# Patient Record
Sex: FEMALE | Race: WHITE | URBAN - METROPOLITAN AREA
[De-identification: names, ages, dates, MRNs, and addresses within clinical notes are randomized per-mention and may not be internally consistent; named-entity substitution may affect disease eponyms.]

---

## 2019-12-04 ENCOUNTER — HISTORICAL (OUTPATIENT)
Dept: ADMINISTRATIVE | Facility: HOSPITAL | Age: 54
End: 2019-12-04

## 2020-03-04 LAB — ERYTHROCYTE [SEDIMENTATION RATE] IN BLOOD: 1 MM/HR (ref 0–20)

## 2020-03-09 ENCOUNTER — HISTORICAL (OUTPATIENT)
Dept: LAB | Facility: HOSPITAL | Age: 55
End: 2020-03-09

## 2022-04-10 ENCOUNTER — HISTORICAL (OUTPATIENT)
Dept: ADMINISTRATIVE | Facility: HOSPITAL | Age: 57
End: 2022-04-10

## 2022-04-27 VITALS
WEIGHT: 193.56 LBS | DIASTOLIC BLOOD PRESSURE: 75 MMHG | SYSTOLIC BLOOD PRESSURE: 124 MMHG | BODY MASS INDEX: 31.11 KG/M2 | HEIGHT: 66 IN

## 2022-05-04 NOTE — HISTORICAL [facility] CERNER
This is a historical note converted from Jordin. Formatting and pictures may have been removed.  Please reference Jordin for original formatting and attached multimedia. Chief Complaint  Rt knee sprain  History of Present Illness  55 yo?RHD Female?1-2ppwk smoker?p[facility]?for?initial visit?for?right?knee pain x 7 weeks. Patient points to?anterior and posterior R knee diffusely.  ?   Onset:?7 weeks ago  Previous treatment:?ED 11/24/19 - Baclofen 10mg TID prn, Norco 5-325 q6hr x 5d and did not help; extra strength tylenol not help; diclofenac gel not help much; HEP; US in 10/2019 neg; brace not help but wrapping it does  Previous injuries:?while standing and turned, twisted knee with pop on 11/23/19 and would lock  Current pain level: 9/10 (rated as?severe)?without pain medication. Quality described as throbbing/indescribable  Modifying Factors:?worse with activity; improved with rest;?stiffness after immobilization;?stiffness not improved?with activity  Associated Symptoms:?crepitus, no grinding;?intermittent numbness, no tingling; intermittent swelling;?no skin changes; some?weakness;?mild decrease in ROM  Activity:?full ADLs; pain limits daily activity (severe)  ?  ?   PMH: left foot compression fx s/p boot (4wks; done 1 mos ago); RA; bone spur in R heel; HTN  PSH: hysterectomy, cholecystectomy, appendectomy  Meds: as in chart  Allergies: codeine  SH: smokes 2ppwk, drinks 1/wk, no drugs  Occupation: pharmacist   FH:?family history of arthritis - mother, gma  Review of Systems  Concussion ROS  Constitutional: no fever, no chills, no weight loss  Eyes: negative except as stated in the?HPI  ENT: negative except as stated in the?HPI  Resp: no cough, no SOB  CV: no swelling, no edema  GI: no urinary retention, no urinary incontinence  : no fecal incontinence  Skin: no rash, no wound  Neuro: negative except as stated in the?HPI  MSK: negative except as stated in the?HPI  Psych: negative  except as stated in the?HPI  Heme/Lymph: no easy bruising, no easy bleeding, no lymphadenopathy  Immuno: no recent infection  Physical Exam  Vitals & Measurements  T:?36.8? ?C (Oral)? HR:?69(Peripheral)? RR:?16? BP:?124/75?  HT:?167.5?cm? WT:?87.8?kg? BMI:?31.29?  General: sitting comfortably in chair  PSYCH:?appropriate mood and affect, cooperative  SKIN: inspection and palpation of skin and soft tissue normal; no scars noted on upper/lower extremities  CV: vascular integrity noted, no edema  Respiratory: No increased work of breathing  NEURO: sensation intact by light touch; DTRs +2 bilateral and symmetrical  LYMPH: no LAD appreciated  ?   Musculoskeletal:  Gait:?antalgic  Aid: ?walker to assist getting up from toilet  ?  Range of Motion  ?? Right Left   Flexion 105 115   Extension 23 15   ?  Strength  ?? Right Left   Quad 5/5 5/5   Hamstring 5/5 5/5   ?  ?  Right Knee  Inspection:?normal alignment,?moderate swelling, no erythema, no bruising, no atrophy  Palpation: tenderness to posterior aspect  ROM: normal  Crepitus: pos  Locking: neg  Signs of DVT: ?no  Capillary refill: ?normal  Sensation: ?normal  Reflexes:??2+  ?   Ballotable Effusion: pos  Varus: neg  Valgus: neg  J sign: neg  Anterior Drawer: neg  Posterior Drawer: neg  Francois: pos at medial aspect  ?   Left Knee  Inspection: normal alignment,?mild swelling, no erythema, no bruising, no atrophy  Palpation: tenderness to posterior aspect  ROM: normal  Crepitus: pos  Locking: neg  Signs of DVT: ?no  Capillary refill: ?normal  Sensation: ?normal  Reflexes:??2+  ?  Ballotable Effusion: pos  Varus: neg  Valgus: neg  J sign: neg  Anterior Drawer: neg  Posterior Drawer: neg  Francois: pos at medial aspect  ?  Assessment/Plan  1.?Sprain of medial meniscus of right knee?S83.8X1A  Plan for management:?will try conservative management for now since patient likely has acute inflammation in addition to chronic degenerative changes: ?HEP, PT, Mobic, hinged R knee  brace  Radiology:?Discussed with patient diagnosis and treatment recommendations.?Radiological studies?independently reviewed by me,?and images were shown to and discussed with?patient.?Radiologist interpretation pending  Immobilization/Stabilization:?hinged R knee brace  Therapy:?HEP, formal Physical Therapy 3 times weekly x 12wks - Rx given  Rx:?OTC Tyl, [id number] pod prn  Work-up:?possible additional imaging in future visit if symptoms not improved  Activity:?WB?as tolerated  RTC:?8 months to reassess, remeasure ROM, and possible R CSI if symptoms not improved  ?  Ordered:  Clinic Follow up, *Est. 02/04/20 3:00:00 CST, Order for future visit, Sprain of medial meniscus of right knee  Tear of meniscus of right knee  Osteoarthrosis of knee  Contracture of right hamstring  Muscular deconditioning  Knee pain, right, [facility]...  ?  2.?Tear of meniscus of right knee?S83.206A  as above  Ordered:  Clinic Follow up, *Est. 02/04/20 3:00:00 CST, Order for future visit, Sprain of medial meniscus of right knee  Tear of meniscus of right knee  Osteoarthrosis of knee  Contracture of right hamstring  Muscular deconditioning  Knee pain, right, [facility]...  ?  3.?Osteoarthrosis of knee?M17.10  as above  Ordered:  Clinic Follow up, *Est. 02/04/20 3:00:00 CST, Order for future visit, Sprain of medial meniscus of right knee  Tear of meniscus of right knee  Osteoarthrosis of knee  Contracture of right hamstring  Muscular deconditioning  Knee pain, right, [facility]...  ?  4.?Contracture of right hamstring?M62.451  as above  discussed stretching exercises with patient  Ordered:  Clinic Follow up, *Est. 02/04/20 3:00:00 CST, Order for future visit, Sprain of medial meniscus of right knee  Tear of meniscus of right knee  Osteoarthrosis of knee  Contracture of right hamstring  Muscular deconditioning  Knee pain, right, [facility]...  ?  5.?Muscular  deconditioning?R29.898  ?as above  Ordered:  Clinic Follow up, *Est. 02/04/20 3:00:00 CST, Order for future visit, Sprain of medial meniscus of right knee  Tear of meniscus of right knee  Osteoarthrosis of knee  Contracture of right hamstring  Muscular deconditioning  Knee pain, right, [facility]...  ?  6.?Smoker?F17.200  ?smoking cessation counseling provided  ?  Orders:  meloxicam, 15 mg = 1 tab(s), Oral, Daily, take with food. Do not take another NSAID while taking this., # 30 tab(s), 1 Refill(s), Pharmacy: [facility]  XR Knee Right 4 or More Views, Routine, 12/04/19 7:47:00 CST, Pain, pain, None, Ambulatory, Rad Type, Knee pain, right, Not Scheduled, 12/04/19 7:47:00 CST  Ita Cedeno MD ([id number])  [facility]   Medications  acetaminophen-hydrocodone 325 mg-5 mg oral tablet, 1 tab(s), Oral, q6hr,? ?Not Taking, Completed Rx  Ambien 10 mg oral tablet, 10 mg= 1 tab(s), Oral, Once a day (at bedtime), PRN  baclofen 10 mg oral tablet, 10 mg= 1 tab(s), Oral, TID, PRN  estradiol, 1 mg, Oral, Daily  indomethacin 25 mg oral capsule, 25 mg= 1 cap(s), Oral, TID, PRN  metoprolol succinate 50 mg oral capsule, extended release, 50 mg= 1 cap(s), Oral, Daily  Mobic 15 mg oral tablet, 15 mg= 1 tab(s), Oral, Daily, 1 refills  otc herbal medication pressure low  oxybutynin 10 mg/24 hr oral tablet, extended release, 10 mg= 1 tab(s), Oral, Daily  Protonix 40 mg ORAL enteric coated tablet, 40 mg= 1 tab(s), Oral, BID  Xanax 0.5 mg oral tablet, 0.5 mg= 1 tab(s), Oral, TID      Discussed with the resident at time of visit? Patient chart reviewed. Patient seen and evaluated at time of visit.?HPI, PE, and Assessment and Plan reviewed. Treatment plan is reasonable and appropriate.? All questions were answered.?Compliance with treatment plan is appropriate.  ?Radiology images independently reviewed and agree with radiologist. ?Radiology images independently reviewed and agree with  resident.  ?  Follow up at 8wks***